# Patient Record
Sex: MALE | Race: AMERICAN INDIAN OR ALASKA NATIVE | NOT HISPANIC OR LATINO
[De-identification: names, ages, dates, MRNs, and addresses within clinical notes are randomized per-mention and may not be internally consistent; named-entity substitution may affect disease eponyms.]

---

## 2023-09-05 ENCOUNTER — APPOINTMENT (OUTPATIENT)
Dept: ORTHOPEDIC SURGERY | Facility: CLINIC | Age: 53
End: 2023-09-05
Payer: COMMERCIAL

## 2023-09-05 VITALS — HEIGHT: 72 IN | WEIGHT: 205 LBS | BODY MASS INDEX: 27.77 KG/M2

## 2023-09-05 DIAGNOSIS — Z78.9 OTHER SPECIFIED HEALTH STATUS: ICD-10-CM

## 2023-09-05 DIAGNOSIS — G95.9 DISEASE OF SPINAL CORD, UNSPECIFIED: ICD-10-CM

## 2023-09-05 DIAGNOSIS — M54.50 LOW BACK PAIN, UNSPECIFIED: ICD-10-CM

## 2023-09-05 PROBLEM — Z00.00 ENCOUNTER FOR PREVENTIVE HEALTH EXAMINATION: Status: ACTIVE | Noted: 2023-09-05

## 2023-09-05 PROBLEM — D49.7 INTRADURAL EXTRAMEDULLARY THORACIC TUMOR: Status: ACTIVE | Noted: 2023-09-05

## 2023-09-05 PROBLEM — G93.0 ARACHNOID CYST: Status: ACTIVE | Noted: 2023-09-05

## 2023-09-05 PROCEDURE — 99204 OFFICE O/P NEW MOD 45 MIN: CPT

## 2023-09-05 PROCEDURE — 72110 X-RAY EXAM L-2 SPINE 4/>VWS: CPT

## 2023-09-05 PROCEDURE — 72070 X-RAY EXAM THORAC SPINE 2VWS: CPT

## 2023-09-05 NOTE — ASSESSMENT
[FreeTextEntry1] : 52 year old male presents with acute exacerbation of chronic low back pain.  He reports being told he has a cyst in his spine.   He reports numbness in his legs.  He feels weaker especially in his thigh.  He has worsening balance.  He is otherwise neurologically intact.  His imaging is concerning for an arachnoid cyst with cord compression.  He was given a referral to see Dr. Randy D'Amico a neurosurgeon for possible surgery.  He will be sent for cervical and lumbar MRI w/ and w/o contrast.  He can continue PT. He can continue morphine. We discussed red flag symptoms that would require emergent evaluation. He knows to call with any questions or concerns or if his symptoms acutely worsen.

## 2023-09-05 NOTE — PHYSICAL EXAM
[de-identified] : General: No acute distress, conversant, well-nourished. Head: Normocephalic, atraumatic Neck: trachea midline, FROM Heart: normotensive and normal rate and rhythm Lungs: No labored breathing Skin: No abrasions, no rashes, no edema Psych: Alert and oriented to person, place and time Extremities: no peripheral edema or digital cyanosis Gait: Normal gait. Can perform tandem gait.   Vascular: warm and well perfused distally, palpable distal pulses MSK: Spine:  No tenderness to palpation.  No step-off, no deformity.  NEURO: Sensation           Left            C5     2/2                C6     2/2                C7     2/2                C8     2/2               T1     2/2                        Right          C5     2/2                C6     2/2                C7     2/2                C8     2/2               T1     2/2        Motor:                                                 Left              C5 (deltoid abduction)             5/5                C6 (biceps flexion)                   5/5                 C7 (triceps extension)             5/5                C8 (finger flexion)                     5/5                T1 (interosseous)                     5/5                                                            Right            C5 (deltoid abduction)             5/5                C6 (biceps flexion)                   5/5                 C7 (triceps extension)             5/5                C8 (finger flexion)                     5/5                T1 (interosseous)                     5/5                       Sensation  Left L2  -  2/2             Left L3  -  1/2 Left L4  -  1/2 Left L5  -  1/2 Left S1  -  1/2  Right L2  -  2/2             Right L3  -  1/2 Right L4  -  1/2 Right L5  -  1/2 Right S1  -  1/2  Motor:  Left L2 (hip flexion)                            5/5                 Left L3 (knee extension)                   5/5                 Left L4 (ankle dorsiflexion)                 5/5                 Left L5 (long toe extensor)                5/5                 Left S1 (ankle plantar flexion)           5/5  Right L2 (hip flexion)                            5/5                 Right L3 (knee extension)                   5/5                 Right L4 (ankle dorsiflexion)                 5/5                 Right L5 (long toe extensor)                5/5                 Right S1 (ankle plantar flexion)           5/5  Reflexes: Increased in lower extremity Negative Spurlings test.  Negative Hoffmans reflex.   Negative clonus.  Equivical Babinski [de-identified] : I ordered radiographs to evaluate the patient's symptoms. Thoracic 2 view radiographs obtained in the office today shows no fracture or dislocation.  Lumbar 4 view radiographs taken in the office today show no dislocation or fracture.  Lumbar spondylosis.  No instability on dynamic series.  Thoracic MRI (8/22/23): 1. Approximate 3.7 cm CC by 1.2 cm AP by 1.4 cm transverse dimension, nonenhancing, intradural extra medullary probable arachnoid cyst with compression of the T12 spinal cord with anterior displacement and mild prominence of the central canal proximally at the T10-11 through T11-12 levels. Differential includes syrinx. 2. Mild/moderate, broad-based left posterolateral T7-T8 disc herniation with mild thecal sac flattening. 3. No spinal stenosis.  Lumbar MRI (6/20/23): Focal anterior displacement of the spinal cord at the level of T12-L1 may represent spinal cord herniation versus underlying dorsal arachnoid cyst. A dedicated MRI of the thoracic spine with and without intravenous contrast is recommended for further evaluation.  Straightening of the normal lumbar lordosis may be seen with muscle spasm.  Mild to moderate neuroforaminal stenosis from L2-3 to L4-5. No spinal canal stenosis.

## 2023-09-05 NOTE — HISTORY OF PRESENT ILLNESS
[de-identified] : 52 year old male presents with acute exacerbation of chronic low back pain.  He reports being told he has a cyst in his spine.   He reports numbness in his legs.  He feels weaker especially in his thigh.  He has worsening balance.  He denies recent illness, fevers, saddle anesthesia, urinary retention or fecal incontinence.  He has an intrathecal pain pump.  He takes morphine with relief.

## 2023-09-06 ENCOUNTER — APPOINTMENT (OUTPATIENT)
Dept: NEUROSURGERY | Facility: CLINIC | Age: 53
End: 2023-09-06
Payer: COMMERCIAL

## 2023-09-06 ENCOUNTER — NON-APPOINTMENT (OUTPATIENT)
Age: 53
End: 2023-09-06

## 2023-09-06 VITALS
OXYGEN SATURATION: 94 % | BODY MASS INDEX: 27.77 KG/M2 | WEIGHT: 205 LBS | SYSTOLIC BLOOD PRESSURE: 101 MMHG | HEIGHT: 72 IN | HEART RATE: 80 BPM | DIASTOLIC BLOOD PRESSURE: 69 MMHG | RESPIRATION RATE: 18 BRPM

## 2023-09-06 DIAGNOSIS — G93.0 CEREBRAL CYSTS: ICD-10-CM

## 2023-09-06 DIAGNOSIS — Z87.891 PERSONAL HISTORY OF NICOTINE DEPENDENCE: ICD-10-CM

## 2023-09-06 DIAGNOSIS — D49.7 NEOPLASM OF UNSPECIFIED BEHAVIOR OF ENDOCRINE GLANDS AND OTHER PARTS OF NERVOUS SYSTEM: ICD-10-CM

## 2023-09-06 DIAGNOSIS — G95.20 UNSPECIFIED CORD COMPRESSION: ICD-10-CM

## 2023-09-06 PROCEDURE — 99204 OFFICE O/P NEW MOD 45 MIN: CPT

## 2023-09-06 RX ORDER — TIZANIDINE 4 MG/1
TABLET ORAL
Refills: 0 | Status: ACTIVE | COMMUNITY

## 2023-09-06 RX ORDER — MORPHINE SULFATE 200 MG/1
TABLET, FILM COATED, EXTENDED RELEASE ORAL
Refills: 0 | Status: ACTIVE | COMMUNITY

## 2023-09-06 NOTE — ASSESSMENT
[FreeTextEntry1] : 52M with pmhx of muscular dystrophy with pain pump placed 5 years ago who presents after outpatient MRI thoracic and lumbar spine identify a T 12 thoracic arachnoid cyst with compression of the [place]. Patient with slowly progressing features of myelopathy described as balance issues, and leg buckling bilaterally. No bladder or bowel symptoms. No sexual dysfunction. MRI without axial cuts through the pathologic segment. Denies arm symptoms. Will obtain MRI the cervical spine, as well as repeat MRI, lumbar spine with cuts extending through pathological area. We discussed the treatment options include conservative management versus attempt to turn off the pain pump versus surgical intervention.  Dr. D'Amico independently reviewed all available images with patient.   PLAN: - MRI cervical and lumbar (ordered per Dr. Pearson) - RTC after imaging   Patient verbalizes understanding of today's discussion and next steps in treatment plan.     A total of 45 minutes was spent reviewing the labs, imaging and physical examination of the patient. We discussed the diagnosis, and the plan. The patient's questions were answered. The patient demonstrated an excellent understanding of the plan.

## 2023-09-06 NOTE — DATA REVIEWED
[de-identified] : 	 Exam requested by: KVNG CASTILLO  [address] [facility] [address] SITE PERFORMED: [facility] Patient: ARIANNA MANNING YOB: 1970 Phone: [phone]  MRN: [id number] Acc: [id number] Date of Exam: 08-   EXAM:  MRI THORACIC SPINE WITHOUT AND WITH CONTRAST  HISTORY:  Follow-up exam.  TECHNIQUE: Multiplanar, multi-sequential MRI of the thoracic spine was obtained on a 1.5T scanner using a standard protocol.  IV Contrast:  20 ml of Clariscan was injected from a 20 ml single use vial.  COMPARISON:  MRI lumbar spine 6/20/2023.  FINDINGS:   OSSEOUS STRUCTURES: No acute fracture. Vertebral body heights are preserved. No areas of bone destruction or abnormal marrow replacement.  ALIGNMENT: No spondylolisthesis.  SPINAL CORD: Approximate 3.7 cm CC by 1.2 cm AP by 1.4 cm transverse dimension, CSF intensity, intradural extra medullary posterior T11-12 through T12-L1 probable intradural extra medullary arachnoid cyst with displacement of the distal thoracic spinal cord anteriorly. Findings would correspond to prior MRI lumbar spine study. There  There is prominence of the central canal particularly at the T10-11 through T11-T12 levels (sagittal T2 FSE image 7, series 105). Remaining thoracic spinal cord demonstrates no enhancing mass lesion, abnormal signal or abnormal enhancement. No other intraspinal masses demonstrated.  PARASPINAL SOFT TISSUES: Unremarkable (within limits of nondedicated technique).  INCLUDED CERVICAL AND LUMBAR SPINE: Unremarkable.  DISCS: Mild desiccation of the C7-T1 through T8-9 and T12-L1 discs without significant loss of disc height.  T7-T8: Mild/moderate broad-based left posterolateral disc herniation with mild thecal sac flattening. No spinal cord compression or spinal stenosis. No significant foraminal narrowing.  Remaining thoracic levels: No disc herniation, significant disc bulge, spinal stenosis or foraminal narrowing.  IMPRESSION: 1. Approximate 3.7 cm CC by 1.2 cm AP by 1.4 cm transverse dimension, nonenhancing, intradural extra medullary probable arachnoid cyst with compression of the T12 spinal cord with anterior displacement and mild prominence of the central canal proximally at the T10-11 through T11-12 levels. Differential includes syrinx. 2. Mild/moderate, broad-based left posterolateral T7-T8 disc herniation with mild thecal sac flattening. 3. No spinal stenosis.  Thank you for the opportunity to participate in the care of this patient.

## 2023-09-06 NOTE — REVIEW OF SYSTEMS
[Leg Weakness] : leg weakness [Numbness] : numbness [Tingling] : tingling [As Noted in HPI] : as noted in HPI [Fever] : no fever [Chills] : no chills [Chest Pain] : no chest pain [Palpitations] : no palpitations [Shortness Of Breath] : no shortness of breath

## 2023-09-06 NOTE — PHYSICAL EXAM
[Oriented To Time, Place, And Person] : oriented to person, place, and time [Impaired Insight] : insight and judgment were intact [Affect] : the affect was normal [Memory Recent] : recent memory was not impaired [5] : S1 ankle flexors 5/5 [2+] : Patella left 2+ [Sclera] : the sclera and conjunctiva were normal [Neck Appearance] : the appearance of the neck was normal [] : no respiratory distress [Respiration, Rhythm And Depth] : normal respiratory rhythm and effort [Skin Color & Pigmentation] : normal skin color and pigmentation [___] : absent on the right [Babin] : Babin's sign was not demonstrated

## 2023-09-06 NOTE — HISTORY OF PRESENT ILLNESS
[de-identified] : 53 y/o male, former smoker (quit 25 yrs ago) with PMHx of muscular dystrophy, morphine pain management pump placed 5 yrs ago (follows with Dr. Sunny Funk), who presents as referral from ortho Dr. Pearson for evaluation of intradural extramedullary nonenhancing mass on recent MRI.   Pt endorses had morphine pain pump placed 5 yrs ago for back pain. Follows with Dr. Sunny Funk. Recently completed MRI thoracic spine 8/22/23 @ [facility] which showed nonenhancing, intradural extra medullary probable arachnoid cyst with compression of the T12 spinal cord.  He saw ortho Griffin Pearson yesterday and was ordered to complete MRI cervical and lumbar w/wo contrast and referred to neurosurgery.   Pt endorses intermittent back pain.  Biggest complaint is his leg weakness that is worse with standing/ walking. Has progressively worsened over the past 2 years. Describes his legs as buckling/ quivering when standing for more than an hour.  Also left anterior thigh numbness and diminished sensation that is not new.  Denies issues with bowel/bladder, balance issues.   Ortho: Griffin Pearson  Pain management: Sunny Funk)